# Patient Record
Sex: MALE | Race: OTHER
[De-identification: names, ages, dates, MRNs, and addresses within clinical notes are randomized per-mention and may not be internally consistent; named-entity substitution may affect disease eponyms.]

---

## 2019-08-25 ENCOUNTER — HOSPITAL ENCOUNTER (EMERGENCY)
Dept: HOSPITAL 72 - EMR | Age: 19
Discharge: HOME | End: 2019-08-25
Payer: COMMERCIAL

## 2019-08-25 VITALS — SYSTOLIC BLOOD PRESSURE: 124 MMHG | DIASTOLIC BLOOD PRESSURE: 84 MMHG

## 2019-08-25 VITALS — SYSTOLIC BLOOD PRESSURE: 123 MMHG | DIASTOLIC BLOOD PRESSURE: 73 MMHG

## 2019-08-25 VITALS — WEIGHT: 200 LBS | HEIGHT: 71 IN | BODY MASS INDEX: 28 KG/M2

## 2019-08-25 VITALS — HEIGHT: 68 IN | BODY MASS INDEX: 28.79 KG/M2 | WEIGHT: 190 LBS

## 2019-08-25 VITALS — SYSTOLIC BLOOD PRESSURE: 118 MMHG | DIASTOLIC BLOOD PRESSURE: 79 MMHG

## 2019-08-25 DIAGNOSIS — Z90.49: ICD-10-CM
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
## 2019-08-25 DIAGNOSIS — F12.10: Primary | ICD-10-CM

## 2019-08-25 DIAGNOSIS — T40.7X5A: Primary | ICD-10-CM

## 2019-08-25 DIAGNOSIS — R41.82: ICD-10-CM

## 2019-08-25 DIAGNOSIS — F17.200: ICD-10-CM

## 2019-08-25 LAB
ADD MANUAL DIFF: NO
ALBUMIN SERPL-MCNC: 4.2 G/DL (ref 3.4–5)
ALBUMIN/GLOB SERPL: 1.2 {RATIO} (ref 1–2.7)
ALP SERPL-CCNC: 66 U/L (ref 46–116)
ALT SERPL-CCNC: 47 U/L (ref 12–78)
ANION GAP SERPL CALC-SCNC: 11 MMOL/L (ref 5–15)
APPEARANCE UR: CLEAR
APTT PPP: YELLOW S
AST SERPL-CCNC: 22 U/L (ref 15–37)
BASOPHILS NFR BLD AUTO: 0.4 % (ref 0–2)
BILIRUB SERPL-MCNC: 0.5 MG/DL (ref 0.2–1)
BUN SERPL-MCNC: 14 MG/DL (ref 7–18)
CALCIUM SERPL-MCNC: 9 MG/DL (ref 8.5–10.1)
CHLORIDE SERPL-SCNC: 102 MMOL/L (ref 98–107)
CK SERPL-CCNC: 704 U/L (ref 26–308)
CO2 SERPL-SCNC: 27 MMOL/L (ref 21–32)
CREAT SERPL-MCNC: 1.1 MG/DL (ref 0.55–1.3)
EOSINOPHIL NFR BLD AUTO: 0.4 % (ref 0–3)
ERYTHROCYTE [DISTWIDTH] IN BLOOD BY AUTOMATED COUNT: 11 % (ref 11.6–14.8)
GLOBULIN SER-MCNC: 3.6 G/DL
GLUCOSE UR STRIP-MCNC: NEGATIVE MG/DL
HCT VFR BLD CALC: 43.8 % (ref 42–52)
HGB BLD-MCNC: 15.1 G/DL (ref 14.2–18)
KETONES UR QL STRIP: (no result)
LEUKOCYTE ESTERASE UR QL STRIP: NEGATIVE
LYMPHOCYTES NFR BLD AUTO: 16.1 % (ref 20–45)
MCV RBC AUTO: 85 FL (ref 80–99)
MONOCYTES NFR BLD AUTO: 4.8 % (ref 1–10)
NEUTROPHILS NFR BLD AUTO: 78.3 % (ref 45–75)
NITRITE UR QL STRIP: NEGATIVE
PH UR STRIP: 5 [PH] (ref 4.5–8)
PLATELET # BLD: 243 K/UL (ref 150–450)
POTASSIUM SERPL-SCNC: 3.4 MMOL/L (ref 3.5–5.1)
PROT UR QL STRIP: (no result)
RBC # BLD AUTO: 5.16 M/UL (ref 4.7–6.1)
SODIUM SERPL-SCNC: 140 MMOL/L (ref 136–145)
SP GR UR STRIP: 1.02 (ref 1–1.03)
UROBILINOGEN UR-MCNC: 1 MG/DL (ref 0–1)
WBC # BLD AUTO: 12.6 K/UL (ref 4.8–10.8)

## 2019-08-25 PROCEDURE — 81003 URINALYSIS AUTO W/O SCOPE: CPT

## 2019-08-25 PROCEDURE — 99282 EMERGENCY DEPT VISIT SF MDM: CPT

## 2019-08-25 PROCEDURE — 80329 ANALGESICS NON-OPIOID 1 OR 2: CPT

## 2019-08-25 PROCEDURE — 82550 ASSAY OF CK (CPK): CPT

## 2019-08-25 PROCEDURE — 36415 COLL VENOUS BLD VENIPUNCTURE: CPT

## 2019-08-25 PROCEDURE — 96375 TX/PRO/DX INJ NEW DRUG ADDON: CPT

## 2019-08-25 PROCEDURE — 85025 COMPLETE CBC W/AUTO DIFF WBC: CPT

## 2019-08-25 PROCEDURE — 80307 DRUG TEST PRSMV CHEM ANLYZR: CPT

## 2019-08-25 PROCEDURE — 96361 HYDRATE IV INFUSION ADD-ON: CPT

## 2019-08-25 PROCEDURE — 93005 ELECTROCARDIOGRAM TRACING: CPT

## 2019-08-25 PROCEDURE — 96374 THER/PROPH/DIAG INJ IV PUSH: CPT

## 2019-08-25 PROCEDURE — 80053 COMPREHEN METABOLIC PANEL: CPT

## 2019-08-25 PROCEDURE — 99284 EMERGENCY DEPT VISIT MOD MDM: CPT

## 2019-08-25 NOTE — EMERGENCY ROOM REPORT
History of Present Illness


General


Chief Complaint:  General Complaint


Source:  Patient





Present Illness


HPI


18-year-old male presents with acute marijuana use, patient has been feeling 

out of his mind since 11 PM no alleviating factors aggravated by marijuana, 

severity is moderate and constant, patient presents for reevaluation states he 

still feels the symptoms and he feels out of his mind no SI HI, patient was 

concerned and presents for evaluation


Allergies:  


Coded Allergies:  


     No Known Allergies (Unverified , 8/25/19)





Patient History


Past Medical History:  see triage record


Social History:  Reports: drug use - Recreational marijuana


Reviewed Nursing Documentation:  PMH: Agreed; PSxH: Agreed





Nursing Documentation-PMH


Hx Gastrointestinal Problems:  Yes - appendectomy





Review of Systems


All Other Systems:  negative except mentioned in HPI





Physical Exam





Vital Signs








  Date Time  Temp Pulse Resp B/P (MAP) Pulse Ox O2 Delivery O2 Flow Rate FiO2


 


8/25/19 11:16 98.6 93 18 124/84 (97) 96 Room Air  








Sp02 EP Interpretation:  reviewed, normal


General Appearance:  well appearing, no apparent distress, alert


Head:  normocephalic, atraumatic


Eyes:  bilateral eye PERRL, bilateral eye EOMI


ENT:  uvula midline, moist mucus membranes


Neck:  supple, thyroid normal, supple/symm/no masses


Respiratory:  lungs clear, no respiratory distress, no retraction, no accessory 

muscle use


Cardiovascular #1:  normal peripheral pulses, regular rate, rhythm, no edema, 

no gallop, no murmur


Gastrointestinal:  non tender, soft, no guarding, no rebound


Musculoskeletal:  normal inspection


Neurologic:  alert, oriented x3


Psychiatric:  no suicidal/homicidal ideation, anxious


Skin:  no rash, warm/dry





Medical Decision Making


Diagnostic Impression:  


 Primary Impression:  


 Marijuana use


ER Course


18-year-old male presents with acute marijuana use, patient no acute distress, 

counseled patient that it takes time for the drug to work its way out of the 

system, will provide patient with Ativan and Benadryl here, patient states he 

has been having a hard time sleeping, patient states he will go with his friend 

to go sleep, his friend will drive him.


Disposition home with return precautions





Last Vital Signs








  Date Time  Temp Pulse Resp B/P (MAP) Pulse Ox O2 Delivery O2 Flow Rate FiO2


 


8/25/19 11:27  93 18   Room Air  


 


8/25/19 11:22 98.6   124/84 96   








Disposition:  HOME, SELF-CARE


Condition:  Stable


Referrals:  


[facility]





[facility]


Patient Instructions:  Cannabis Use Disorder





Additional Instructions:  


The patient was provided with discharge instructions, notified to follow-up 

with a primary care doctor and or specialist in the next 24-48 hours, and to 

return to the ED if they have worsening of their symptoms. 





Please note that this report is being documented using DRAGON technology.


This can lead to erroneous entry secondary to incorrect interpretation by the 

dictating instrument.











Louis Benítez MD Aug 25, 2019 11:36

## 2019-08-25 NOTE — NUR
ED Nurse Note:

Patient still sleeping, tolerated fluids and medications well. Chest rise is 
symmetric, patient easily arousable. Will continue to monitor.

## 2019-08-25 NOTE — NUR
ER DISCHARGE NOTE:

Patient is cleared to be discharged per ERMD, pt is aox4, on room air, with 
stable vital signs. pt was given dc instructions, pt was able to verbalize 
understanding, pt id band removed. Pt is able to ambulate with steady gait. Pt 
took all belongings and accompanied by friend.

## 2019-08-25 NOTE — NUR
ED Nurse Note:



Patient walked in c/o generalized anxiety x 1 day post vaping THC last night. 
Pt was at [facility] at night for the same issue. Pt V/S stable, denies pain. Pt is 
A&O x4 with no s/s of acute distress noted at this time. ERMD at bedside 
evaluating the pt.

## 2019-08-25 NOTE — CARDIOLOGY REPORT
--------------- APPROVED REPORT --------------





EKG Measurement

Heart [id number]

[id number]

[id number]

[id number]

[id number]





Sinus tachycardia

Otherwise normal ECG

## 2019-08-25 NOTE — NUR
ED Nurse Note:

Patient is awake, alert and oriented x4. he is able to ambulate to the 
restroom, unassisted. Will continue to monitor.

## 2019-08-25 NOTE — EMERGENCY ROOM REPORT
History of Present Illness


General


Chief Complaint:  General Complaint


Source:  Friend, EMS





Present Illness


HPI


Patient presents with altered level of consciousness after ingestion of 

marijuana.  He feels unreality and nauseated and some shortness of breath.  

Apparently the patient had been drinking alcohol.





The patient is unable to answer any other questions at this time.


Allergies:  


Coded Allergies:  


     No Known Allergies (Unverified , 8/25/19)





Patient History


Limited by:  medical condition


Past Medical History:  see triage record


Social History:  Reports: smoking - Jull, alcohol use, drug use


Social History Narrative


brought by friend


Reviewed Nursing Documentation:  PMH: Agreed; PSxH: Agreed





Nursing Documentation-PM


Past Medical History:  No Stated History





Review of Systems


All Other Systems:  limited





Physical Exam





Vital Signs








  Date Time  Temp Pulse Resp B/P (MAP) Pulse Ox O2 Delivery O2 Flow Rate FiO2


 


8/25/19 01:01 98.2 123 18 123/73 (90) 99 Room Air  








Sp02 EP Interpretation:  reviewed, normal


General Appearance:  alert, non-toxic, mild distress


Head:  normocephalic


Eyes:  bilateral eye PERRL, bilateral eye EOMI, bilateral eye Scleral Injection


ENT:  moist mucus membranes


Neck:  supple


Respiratory:  lungs clear, normal breath sounds


Cardiovascular #1:  regular rate, rhythm


Cardiovascular #2:  2+ radial (R)


Gastrointestinal:  normal inspection, normal bowel sounds, non tender, no mass, 

non-distended


Musculoskeletal:  back normal, gait/station normal, normal range of motion


Neurologic:  alert, CNs III-XII nml as tested, motor strength/tone normal, DTRs 

symmetric, sensory intact, oriented - X2


Psychiatric:  anxious, other - Perseverating


Skin:  no rash





Medical Decision Making


Diagnostic Impression:  


 Primary Impression:  


 Adverse reaction to cannabis


 Qualified Codes:  T40.7X5A - Adverse effect of cannabis (derivatives), initial 

encounter


ER Course


Patient presents after initial cannabis exposure.  Differential includes 

adverse reaction to cannabis, electrolyte imbalance, gastroenteritis, 

exacerbation of underlying psychologic disorder amongst others.  Evaluation 

will be with EKG, and labs.  The patient will be treated with IV hydration and 

Ativan along with Zofran.  He needs repeated evaluations.  There is no 

indication for CT of the head as the patient has a nonfocal neurologic exam at 

this time there is no history of trauma.





EKG was sinus tachycardia.  Potassium minimally low.  Tox screen positive for 

THC.  Blood alcohol nil.





Patient improved.  Still sleepy.  Denies intent to harm self.  Admits to 

alcohol ingestion.  Denies major medical problems or psychiatric issues.





Patient able to ambulate to the bathroom.  Attempt to call a friend to come 

pick the patient up.  Unable to do so.  Continued observation.





Patient ambulatory and nondelusional with purposeful behaviors still denies 

suicidal or homicidal ideation.





Patient stable for outpatient observation and treatment.





Laboratory Tests








Test


  8/25/19


01:32 8/25/19


01:40


 


Urine Color Yellow   


 


Urine Appearance Clear   


 


Urine pH 5 (4.5-8.0)   


 


Urine Specific Gravity


  1.025


(1.005-1.035) 


 


 


Urine Protein


  2+ (NEGATIVE)


H 


 


 


Urine Glucose (UA)


  Negative


(NEGATIVE) 


 


 


Urine Ketones


  1+ (NEGATIVE)


H 


 


 


Urine Blood


  1+ (NEGATIVE)


H 


 


 


Urine Nitrite


  Negative


(NEGATIVE) 


 


 


Urine Bilirubin


  Negative


(NEGATIVE) 


 


 


Urine Urobilinogen


  1 MG/DL


(0.0-1.0)  H 


 


 


Urine Leukocyte Esterase


  Negative


(NEGATIVE) 


 


 


Urine RBC


  0-2 /HPF (0 -


0)  H 


 


 


Urine WBC


  0-2 /HPF (0 -


0) 


 


 


Urine Squamous Epithelial


Cells Occasional


/LPF 


 


 


Urine Bacteria


  Few /HPF


(NONE) 


 


 


Urine Mucus


  Moderate /LPF


(NONE/OCC)  H 


 


 


Urine Opiates Screen


  Negative


(NEGATIVE) 


 


 


Urine Barbiturates Screen


  Negative


(NEGATIVE) 


 


 


Phencyclidine (PCP) Screen


  Negative


(NEGATIVE) 


 


 


Urine Amphetamines Screen


  Negative


(NEGATIVE) 


 


 


Urine Benzodiazepines Screen


  Negative


(NEGATIVE) 


 


 


Urine Cocaine Screen


  Negative


(NEGATIVE) 


 


 


Urine Marijuana (THC) Screen


  Positive


(NEGATIVE)  H 


 


 


White Blood Count


  


  12.6 K/UL


(4.8-10.8)  H


 


Red Blood Count


  


  5.16 M/UL


(4.70-6.10)


 


Hemoglobin


  


  15.1 G/DL


(14.2-18.0)


 


Hematocrit


  


  43.8 %


(42.0-52.0)


 


Mean Corpuscular Volume  85 FL (80-99)  


 


Mean Corpuscular Hemoglobin


  


  29.3 PG


(27.0-31.0)


 


Mean Corpuscular Hemoglobin


Concent 


  34.5 G/DL


(32.0-36.0)


 


Red Cell Distribution Width


  


  11.0 %


(11.6-14.8)  L


 


Platelet Count


  


  243 K/UL


(150-450)


 


Mean Platelet Volume


  


  7.6 FL


(6.5-10.1)


 


Neutrophils (%) (Auto)


  


  78.3 %


(45.0-75.0)  H


 


Lymphocytes (%) (Auto)


  


  16.1 %


(20.0-45.0)  L


 


Monocytes (%) (Auto)


  


  4.8 %


(1.0-10.0)


 


Eosinophils (%) (Auto)


  


  0.4 %


(0.0-3.0)


 


Basophils (%) (Auto)


  


  0.4 %


(0.0-2.0)


 


Sodium Level


  


  140 MMOL/L


(136-145)


 


Potassium Level


  


  3.4 MMOL/L


(3.5-5.1)  L


 


Chloride Level


  


  102 MMOL/L


()


 


Carbon Dioxide Level


  


  27 MMOL/L


(21-32)


 


Anion Gap


  


  11 mmol/L


(5-15)


 


Blood Urea Nitrogen


  


  14 mg/dL


(7-18)


 


Creatinine


  


  1.1 MG/DL


(0.55-1.30)


 


Estimate Glomerular


Filtration Rate 


  > 60 mL/min


(>60)


 


Glucose Level


  


  161 MG/DL


()  H


 


Calcium Level


  


  9.0 MG/DL


(8.5-10.1)


 


Total Bilirubin


  


  0.5 MG/DL


(0.2-1.0)


 


Aspartate Amino Transferase


(AST) 


  22 U/L (15-37)


 


 


Alanine Aminotransferase (ALT)


  


  47 U/L (12-78)


 


 


Alkaline Phosphatase


  


  66 U/L


()


 


Total Creatine Kinase


  


  704 U/L


()  H


 


Total Protein


  


  7.8 G/DL


(6.4-8.2)


 


Albumin


  


  4.2 G/DL


(3.4-5.0)


 


Globulin  3.6 g/dL  


 


Albumin/Globulin Ratio  1.2 (1.0-2.7)  


 


Salicylates Level


  


  0.7 ug/mL


(2.8-20)  L


 


Acetaminophen Level


  


  < 2 MCG/ML


(10-30)  L


 


Serum Alcohol  < 3 mg/dL  








EKG Diagnostic Results


Rate:  tachycardiac


Rhythm:  NSR


ST Segments:  no acute changes





Rhythm Strip Diag. Results


EP Interpretation:  yes


Rhythm:  no PVC's, no ectopy, other - ST





Last Vital Signs








  Date Time  Temp Pulse Resp B/P (MAP) Pulse Ox O2 Delivery O2 Flow Rate FiO2


 


8/25/19 07:16 98.2 123 18 123/73 99 Room Air  





HR taken by me without tachycardia.


Status:  improved


Disposition:  HOME, SELF-CARE


Condition:  Improved











Jhoan Maddox MD Aug 25, 2019 01:10

## 2019-08-25 NOTE — NUR
ER DISCHARGE NOTE:

Patient is cleared to be discharged per ERMD, pt is aox4, on room air, with 
stable vital signs. pt was given dc and prescription instructions, pt was able 
to verbalize understanding, pt id band and iv site removed without 
complications. pt is able to ambulate with steady gait. pt took all belongings. 
Patient provided an address and a taxi was provided to take patient to 
destination. Patient advised to return if he felt the need.